# Patient Record
Sex: MALE | Race: WHITE | Employment: FULL TIME | URBAN - METROPOLITAN AREA
[De-identification: names, ages, dates, MRNs, and addresses within clinical notes are randomized per-mention and may not be internally consistent; named-entity substitution may affect disease eponyms.]

---

## 2017-10-05 ENCOUNTER — APPOINTMENT (OUTPATIENT)
Dept: LAB | Facility: HOSPITAL | Age: 42
End: 2017-10-05
Payer: COMMERCIAL

## 2017-10-05 ENCOUNTER — ALLSCRIPTS OFFICE VISIT (OUTPATIENT)
Dept: OTHER | Facility: OTHER | Age: 42
End: 2017-10-05

## 2017-10-05 DIAGNOSIS — M54.9 DORSALGIA: ICD-10-CM

## 2017-10-05 LAB
BILIRUB UR QL STRIP: NEGATIVE
CLARITY UR: NORMAL
COLOR UR: YELLOW
GLUCOSE (HISTORICAL): NEGATIVE
HGB UR QL STRIP.AUTO: NEGATIVE
KETONES UR STRIP-MCNC: NEGATIVE MG/DL
LEUKOCYTE ESTERASE UR QL STRIP: NEGATIVE
NITRITE UR QL STRIP: NEGATIVE
PH UR STRIP.AUTO: 6 [PH]
PROT UR STRIP-MCNC: NEGATIVE MG/DL
SP GR UR STRIP.AUTO: 1.03
UROBILINOGEN UR QL STRIP.AUTO: 0.2

## 2017-10-05 PROCEDURE — 87086 URINE CULTURE/COLONY COUNT: CPT

## 2017-10-06 LAB — BACTERIA UR CULT: NORMAL

## 2017-10-06 NOTE — PROGRESS NOTES
Assessment  1  Never a smoker   2  Back pain (724 5) (M54 9)   3  CVA tenderness (724 5) (M54 9)    Plan  Back pain    · Cyclobenzaprine HCl - 5 MG Oral Tablet; TAKE 1 TABLET AT BEDTIME   · Naproxen 500 MG Oral Tablet; take 1 tablet every twelve hours   · Urine Dip Automated- POC; Status:Complete - Retrospective By Protocol Authorization;    Done: [id number] 07:55AM  Back pain, CVA tenderness    · (1) URINE CULTURE; Source:Urine, Unspecified Source; Status:Active; Requested  TXT:[id number];   Screening for depression    · *VB-Depression Screening; Status:Complete;   Done: 84LNA2796 07:26AM    Discussion/Summary    I offered Angelica Brooks a CT scan abd/pelvis to rule out renal calculi  He declines test  His urine dip was clear today, but he has a lot of L CVA tenderness  He is insistent that he only has a pulled muscle  I will send his urine for culture, but I also advised him if pain worsens, persists, or any other new symptoms develop including urinary changes/fever, he needs to either call our office or go to the ER  Encouraged him to increase his water intake  The patient was counseled regarding instructions for management,-risk factor reductions,-prognosis,-patient and family education,-risks and benefits of treatment options,-importance of compliance with treatment  Possible side effects of new medications were reviewed with the patient/guardian today  The treatment plan was reviewed with the patient/guardian  The patient/guardian understands and agrees with the treatment plan      Chief Complaint  1  Back Pain  Angelica Brooks is here today with L-sided LBP x 2 days  He feels like the pain is beginning to radiate down his L leg  He has not tried any OTC medication or a heating pad for this  He denies specific injury or trauma to the area  The pain is getting worse  He denies any urinary symptoms/changes  History of Present Illness  HPI: See chief complaint        Review of Systems    Constitutional: no fever-and-no chills  Cardiovascular: no chest pain-and-no palpitations  Respiratory: no shortness of breath  Gastrointestinal: no abdominal pain,-no nausea-and-no diarrhea  Genitourinary: no dysuria,-no urinary hesitancy-and-no incontinence    The patient presents with complaints of sudden onset of constant episodes of moderate left lower back lower back pain, radiating to the left buttock    The patient presents with complaints of sudden onset of moderate left flank pain  Integumentary: no rashes  Neurological: no headache  ROS reviewed  Active Problems  1  Cerumen impaction (380 4) (H61 20)   2  Lower back pain (724 2) (M54 5)   3  Sciatica (724 3) (M54 30)   4  Screening for depression (V79 0) (Z13 89)    Past Medical History  Active Problems And Past Medical History Reviewed: The active problems and past medical history were reviewed and updated today  Family History  Father    1  Family history of Diabetes Mellitus (250 00)   2  Family history of malignant neoplasm (V16 9) (Z80 9)  Family History Reviewed: The family history was reviewed and updated today  Social History   · Being A Social Drinker   · Never a smoker  The social history was reviewed and updated today  The social history was reviewed and is unchanged  Surgical History  Surgical History Reviewed: The surgical history was reviewed and updated today  Current Meds   1  Antipyrine-Benzocaine 5 4-1 4 % SOLN; INSTILL 2 DROPS EVERY 2 HOURS AS   NEEDED FOR PAIN;   Therapy: [id number] to (Evaluate:24Jun2015)  Requested for: 93ICW9613; Last   Rx:23Jun2015 Ordered   2  Debrox 6 5 % Otic Solution; READ AND FOLLOW 'S INSTRUCTIONS   ON BOX; Therapy: [id number] to (Last OC:05BZU4799)  Requested for: [id number] Ordered    The medication list was reviewed and updated today  Allergies  1   No Known Drug Allergies    Vitals   Recorded: 77KIZ9189 99:90PM   Systolic 123, LUE, Sitting   Diastolic 72, LUE, Sitting Height 5 ft 11 in   Weight 209 lb 6 0 oz   BMI Calculated 29 2   BSA Calculated 2 15     Physical Exam    Constitutional   General appearance: No acute distress, well appearing and well nourished  Pulmonary   Respiratory effort: No increased work of breathing or signs of respiratory distress  Auscultation of lungs: Clear to auscultation, equal breath sounds bilaterally, no wheezes, no rales, no rhonci  Cardiovascular   Auscultation of heart: Normal rate and rhythm, normal S1 and S2, without murmurs  Musculoskeletal   Inspection/palpation of joints, bones, and muscles: Abnormal   Appearance - no swelling,-no erythema-and-no ecchymosis  Palpation - left lumbar-and-+ L CVA tenderness on exam tenderness, but-no increased warmth  Skin   Skin and subcutaneous tissue: Normal without rashes or lesions  Psychiatric   Orientation to person, place and time: Normal     Mood and affect: Normal          Results/Data  Urine Dip Automated- POC 05Oct2017 07:55AM Alysha Mill     Test Name Result Flag Reference   Color Yellow     Clarity Transparent     Leukocytes negative     Nitrite negative     Blood negative     Bilirubin negative     Urobilinogen 0 2     Protein negative     Ph 6 0     Specific Gravity 1 030     Ketone negative     Glucose negative       *VB-Depression Screening 47OEI9753 07:26AM Alysha Mill     Test Name Result Flag Reference   Depression Scale Result      Depression Screen - Negative For Symptoms     PHQ-2 Adult Depression Screening [id number] 07:25AM Leyla s     Test Name Result Flag Reference   PHQ-2 Adult Depression Score 0     Over the last two weeks, how often have you been bothered by any of the following problems?   Little interest or pleasure in doing things: Not at all - 0  Feeling down, depressed, or hopeless: Not at all - 0   PHQ-2 Adult Depression Screening Negative         Signatures   Electronically signed by : Yeni Shah [facility]; Oct  5 2017  8:14AM EST (Author)    Electronically signed by : Herman Das DO; Oct  5 2017 11:01AM EST                       (Author)

## 2018-01-13 VITALS
DIASTOLIC BLOOD PRESSURE: 72 MMHG | BODY MASS INDEX: 29.31 KG/M2 | WEIGHT: 209.38 LBS | HEIGHT: 71 IN | SYSTOLIC BLOOD PRESSURE: 116 MMHG

## 2021-10-30 ENCOUNTER — NURSE TRIAGE (OUTPATIENT)
Dept: OTHER | Facility: OTHER | Age: 46
End: 2021-10-30

## 2021-10-31 ENCOUNTER — NURSE TRIAGE (OUTPATIENT)
Dept: OTHER | Facility: OTHER | Age: 46
End: 2021-10-31

## 2021-10-31 DIAGNOSIS — Z20.828 EXPOSURE TO SARS-ASSOCIATED CORONAVIRUS: Primary | ICD-10-CM

## 2021-11-01 PROCEDURE — U0005 INFEC AGEN DETEC AMPLI PROBE: HCPCS | Performed by: FAMILY MEDICINE

## 2021-11-01 PROCEDURE — U0003 INFECTIOUS AGENT DETECTION BY NUCLEIC ACID (DNA OR RNA); SEVERE ACUTE RESPIRATORY SYNDROME CORONAVIRUS 2 (SARS-COV-2) (CORONAVIRUS DISEASE [COVID-19]), AMPLIFIED PROBE TECHNIQUE, MAKING USE OF HIGH THROUGHPUT TECHNOLOGIES AS DESCRIBED BY CMS-2020-01-R: HCPCS | Performed by: FAMILY MEDICINE

## 2021-11-02 ENCOUNTER — TELEPHONE (OUTPATIENT)
Dept: OTHER | Facility: OTHER | Age: 46
End: 2021-11-02

## 2021-11-10 ENCOUNTER — TELEPHONE (OUTPATIENT)
Dept: FAMILY MEDICINE CLINIC | Facility: CLINIC | Age: 46
End: 2021-11-10

## 2022-04-01 ENCOUNTER — OFFICE VISIT (OUTPATIENT)
Dept: URGENT CARE | Facility: MEDICAL CENTER | Age: 47
End: 2022-04-01
Payer: COMMERCIAL

## 2022-04-01 VITALS
RESPIRATION RATE: 18 BRPM | OXYGEN SATURATION: 95 % | TEMPERATURE: 97.1 F | SYSTOLIC BLOOD PRESSURE: 132 MMHG | BODY MASS INDEX: 30.8 KG/M2 | HEART RATE: 89 BPM | HEIGHT: 71 IN | WEIGHT: 220 LBS | DIASTOLIC BLOOD PRESSURE: 88 MMHG

## 2022-04-01 DIAGNOSIS — I88.9 CERVICAL ADENITIS: Primary | ICD-10-CM

## 2022-04-01 DIAGNOSIS — J02.9 SORE THROAT: ICD-10-CM

## 2022-04-01 LAB — S PYO AG THROAT QL: NEGATIVE

## 2022-04-01 PROCEDURE — 99212 OFFICE O/P EST SF 10 MIN: CPT | Performed by: PHYSICIAN ASSISTANT

## 2022-04-01 PROCEDURE — 87880 STREP A ASSAY W/OPTIC: CPT | Performed by: PHYSICIAN ASSISTANT

## 2022-04-01 RX ORDER — AZITHROMYCIN 250 MG/1
TABLET, FILM COATED ORAL
Qty: 6 TABLET | Refills: 0 | Status: SHIPPED | OUTPATIENT
Start: 2022-04-01 | End: 2022-04-05

## 2022-04-01 NOTE — PATIENT INSTRUCTIONS
Adenitis   WHAT YOU NEED TO KNOW:   Adenitis is a condition that causes your lymph nodes to become swollen and tender You may also have a fever  Adenitis is a sign of infection usually caused by bacteria  DISCHARGE INSTRUCTIONS:   Call your local emergency number (911 in the [address] [place] [address],[address]) if:   · You have trouble breathing or swallowing  Return to the emergency department if:   · You have new or worsening redness or swelling  · You develop a large, soft bump that may leak pus  Call your doctor if:   · Your symptoms do not improve after 10 days of treatment  · You have questions or concerns about your condition or care  Medicines: You may  need any of the following:  · Antibiotics  help treat a bacterial infection  · Acetaminophen  decreases pain and fever  It is available without a doctor's order  Ask how much to take and how often to take it  Follow directions  Read the labels of all other medicines you are using to see if they also contain acetaminophen, or ask your doctor or pharmacist  Acetaminophen can cause liver damage if not taken correctly  Do not use more than 4 grams (4,000 milligrams) total of acetaminophen in one day  · NSAIDs , such as ibuprofen, help decrease swelling, pain, and fever  NSAIDs can cause stomach bleeding or kidney problems in certain people  If you take blood thinner medicine, always ask your healthcare provider if NSAIDs are safe for you  Always read the medicine label and follow directions  · Take your medicine as directed  Contact your healthcare provider if you think your medicine is not helping or if you have side effects  Tell him of her if you are allergic to any medicine  Keep a list of the medicines, vitamins, and herbs you take  Include the amounts, and when and why you take them  Bring the list or the pill bottles to follow-up visits  Carry your medicine list with you in case of an emergency      Manage your symptoms:   · Apply moist heat  on your swollen lymph nodes for 20 to 30 minutes every 2 hours or as directed  Heat helps decrease pain and swelling  You can make a moist heat pack by soaking a small towel in hot water  Let it cool until you can hold it with your bare hands  Then wring out the extra water  Place the towel in a plastic bag, and wrap the bag with a dry towel around the bag  Place the pack over your swollen lymph nodes  · Elevate your head and upper back  Keep your head and upper back elevated when you rest, such as in a recliner  Place extra pillows under your head and neck when you sleep in bed  Elevation helps decrease swelling  Prevent an infection:       · Wash your hands often  Wash your hands several times each day  Wash after you use the bathroom, change a child's diaper, and before you prepare or eat food  Use soap and water every time  Rub your soapy hands together, lacing your fingers  Wash the front and back of your hands, and in between your fingers  Use the fingers of one hand to scrub under the fingernails of the other hand  Wash for at least 20 seconds  Rinse with warm, running water for several seconds  Then dry your hands with a clean towel or paper towel  Use hand  that contains alcohol if soap and water are not available  Do not touch your eyes, nose, or mouth without washing your hands first          · Cover a sneeze or cough  Use a tissue that covers your mouth and nose  Throw the tissue away in a trash can right away  Use the bend of your arm if a tissue is not available  Wash your hands well with soap and water or use a hand   · Clean surfaces often  Clean doorknobs, countertops, cell phones, and other surfaces that are touched often  Use a disinfecting wipe, a single-use sponge, or a cloth you can wash and reuse  Use disinfecting  if you do not have wipes  You can create a disinfecting  by mixing 1 part bleach with 10 parts water  · Ask about vaccines you may need    Vaccines help prevent diseases caused by some viruses and bacteria  Get the influenza (flu) vaccine as soon as recommended each year  The flu vaccine is usually available starting in September or October  Flu viruses change, so it is important to get a flu vaccine every year  Get the pneumonia vaccine if recommended  This vaccine is usually recommended every 5 years  Your provider will tell you when to get this vaccine, if needed  He or she can tell you if you should get other vaccines, and when to get them  Follow up with your doctor within 2 days: You may be referred to a dentist or need more tests  Write down your questions so you remember to ask them during your visits  © Copyright [address] 2022 Information is for End User's use only and may not be sold, redistributed or otherwise used for commercial purposes  All illustrations and images included in CareNotes® are the copyrighted property of [employer]  or Yung Nuñ[employer]  The above information is an  only  It is not intended as medical advice for individual conditions or treatments  Talk to your doctor, nurse or pharmacist before following any medical regimen to see if it is safe and effective for you

## 2022-04-01 NOTE — PROGRESS NOTES
[facility]        NAME: Dwain Chapman is a 55 y o  male  : 1975    MRN: [id number]  DATE: 2022  TIME: 11:24 AM    Assessment and Plan   Cervical adenitis [I88 9]  1  Cervical adenitis  azithromycin (ZITHROMAX) 250 mg tablet   2  Sore throat  POCT rapid strepA         Patient Instructions       Follow up with PCP in 3-5 days  Proceed to  ER if symptoms worsen  Chief Complaint     Chief Complaint   Patient presents with    Dizziness     started last week     Generalized Body Aches     started last week     Swollen Glands     right side of throat          History of Present Illness       Patient started with cold symptoms approximately week ago  His girlfriend was sick with similar symptoms diagnosed with tonsillitis and placed on antibiotics  The past few days he noticed a swelling in the right side of his neck  He denies any sore throat ear pain or cough  Took his girlfriend last antibiotic last evening and feels somewhat better this morning  Review of Systems   Review of Systems   Constitutional: Positive for fatigue and fever  HENT: Negative for congestion, rhinorrhea and sore throat  Respiratory: Negative for cough  Gastrointestinal: Negative for diarrhea, nausea and vomiting  Musculoskeletal: Positive for myalgias  Skin: Negative for rash  Neurological: Positive for dizziness and headaches  Current Medications       Current Outpatient Medications:     azithromycin (ZITHROMAX) 250 mg tablet, Take 2 tablets today then 1 tablet daily x 4 days, Disp: 6 tablet, Rfl: 0    Current Allergies     Allergies as of 2022    (No Known Allergies)            The following portions of the patient's history were reviewed and updated as appropriate: allergies, current medications, past family history, past medical history, past social history, past surgical history and problem list      History reviewed  No pertinent past medical history      History reviewed  No pertinent surgical history  History reviewed  No pertinent family history  Medications have been verified  Objective   /88   Pulse 89   Temp (!) 97 1 °F (36 2 °C)   Resp 18   Ht 5' 11" (1 803 m)   Wt 99 8 kg (220 lb)   SpO2 95%   BMI 30 68 kg/m²   No LMP for male patient  Physical Exam     Physical Exam  Vitals and nursing note reviewed  Constitutional:       Appearance: Normal appearance  HENT:      Head: Normocephalic and atraumatic  Right Ear: Tympanic membrane normal       Left Ear: Tympanic membrane normal       Nose: Nose normal       Mouth/Throat:      Mouth: Mucous membranes are moist       Pharynx: Oropharynx is clear  Eyes:      Conjunctiva/sclera: Conjunctivae normal    Cardiovascular:      Rate and Rhythm: Normal rate and regular rhythm  Heart sounds: Normal heart sounds  Pulmonary:      Effort: Pulmonary effort is normal       Breath sounds: Normal breath sounds  Musculoskeletal:      Cervical back: Neck supple  Lymphadenopathy:      Cervical: Cervical adenopathy (Tender enlarged right anterior cervical node ) present  Skin:     General: Skin is warm  Neurological:      Mental Status: He is alert